# Patient Record
Sex: MALE | Race: WHITE | NOT HISPANIC OR LATINO | URBAN - METROPOLITAN AREA
[De-identification: names, ages, dates, MRNs, and addresses within clinical notes are randomized per-mention and may not be internally consistent; named-entity substitution may affect disease eponyms.]

---

## 2020-10-23 ENCOUNTER — OFFICE VISIT (OUTPATIENT)
Dept: URBAN - METROPOLITAN AREA CLINIC 78 | Facility: CLINIC | Age: 65
End: 2020-10-23
Payer: COMMERCIAL

## 2020-10-23 DIAGNOSIS — Z12.11 COLON CANCER SCREENING: ICD-10-CM

## 2020-10-23 PROCEDURE — 99203 OFFICE O/P NEW LOW 30 MIN: CPT | Performed by: INTERNAL MEDICINE

## 2020-10-23 RX ORDER — SODIUM, POTASSIUM,MAG SULFATES 17.5-3.13G
177 ML SOLUTION, RECONSTITUTED, ORAL ORAL
Qty: 1 PACKAGE | Refills: 0 | OUTPATIENT
Start: 2020-10-23 | End: 2020-10-24

## 2020-10-23 NOTE — HPI-TODAY'S VISIT:
The patient presents for a colon cancer screening.   Patient has had a colonoscopy.  The colonoscopy revealed polyps / Diverticulosis / Internal hemorrhoids.  There is no family history of colon polyps or cancer.   Patient denies change in bowel habits, appetite and weight.  Patient denies bleeding per rectum.

## 2020-12-08 ENCOUNTER — OFFICE VISIT (OUTPATIENT)
Dept: URBAN - METROPOLITAN AREA SURGERY CENTER 15 | Facility: SURGERY CENTER | Age: 65
End: 2020-12-08
Payer: COMMERCIAL

## 2020-12-08 DIAGNOSIS — Z86.010 H/O ADENOMATOUS POLYP OF COLON: ICD-10-CM

## 2020-12-08 PROCEDURE — G8907 PT DOC NO EVENTS ON DISCHARG: HCPCS | Performed by: INTERNAL MEDICINE

## 2020-12-08 PROCEDURE — G9936 PMH PLYP/NEO CO/RECT/JUN/ANS: HCPCS | Performed by: INTERNAL MEDICINE

## 2020-12-08 PROCEDURE — 45378 DIAGNOSTIC COLONOSCOPY: CPT | Performed by: INTERNAL MEDICINE

## 2023-04-24 ENCOUNTER — OFFICE VISIT (OUTPATIENT)
Dept: URBAN - METROPOLITAN AREA CLINIC 78 | Facility: CLINIC | Age: 68
End: 2023-04-24
Payer: COMMERCIAL

## 2023-04-24 ENCOUNTER — DASHBOARD ENCOUNTERS (OUTPATIENT)
Age: 68
End: 2023-04-24

## 2023-04-24 VITALS
TEMPERATURE: 98.1 F | HEART RATE: 64 BPM | SYSTOLIC BLOOD PRESSURE: 143 MMHG | BODY MASS INDEX: 30.41 KG/M2 | HEIGHT: 71 IN | RESPIRATION RATE: 16 BRPM | DIASTOLIC BLOOD PRESSURE: 83 MMHG | WEIGHT: 217.2 LBS

## 2023-04-24 DIAGNOSIS — K52.9 CHRONIC DIARRHEA: ICD-10-CM

## 2023-04-24 PROCEDURE — 99204 OFFICE O/P NEW MOD 45 MIN: CPT | Performed by: INTERNAL MEDICINE

## 2023-04-24 RX ORDER — CHOLESTYRAMINE POWDER FOR SUSPENSION 4 G/8.78G
1 SCOOP POWDER, FOR SUSPENSION ORAL ONCE A DAY
Qty: 30 | Refills: 0 | OUTPATIENT

## 2023-04-24 RX ORDER — TADALAFIL 10 MG/1
1 TABLET AS NEEDED TABLET, FILM COATED ORAL ONCE A DAY
Status: ACTIVE | COMMUNITY

## 2023-04-24 NOTE — HPI-TODAY'S VISIT:
Patient was referred by Erick Lara  A copy of this document will be sent to the physician.   Patient is a 67-year-old male seen with regards to change in bowel consistency since July 2022.  His baseline bowel movements are normal once a day.  He now reports 4-5 BMs mostly soft to loose consistency daily PCP gave him antibiotics initially and also ruled out infectious cause by stool studies Denies blood in stool, denies international travel, denies sick contacts. History taking Aleve once a week He does gas cramps relieved by having a bowel movement  Denies  constipation or nocturnal symptoms    Denies Family history of celiac disease, IBD

## 2023-04-28 LAB — PANCREATIC ELASTASE-1: 378

## 2023-05-25 ENCOUNTER — TELEPHONE ENCOUNTER (OUTPATIENT)
Dept: URBAN - METROPOLITAN AREA CLINIC 78 | Facility: CLINIC | Age: 68
End: 2023-05-25

## 2023-05-25 RX ORDER — SODIUM PICOSULFATE, MAGNESIUM OXIDE, AND ANHYDROUS CITRIC ACID 12; 3.5; 1 G/175ML; G/175ML; MG/175ML
175 ML THE FIRST DOSE THE EVENING BEFORE AND SECOND DOSE THE MORNING OF COLONOSCOPY LIQUID ORAL ONCE A DAY
Qty: 350 | OUTPATIENT
Start: 2023-05-26 | End: 2023-05-28

## 2023-06-01 ENCOUNTER — TELEPHONE ENCOUNTER (OUTPATIENT)
Dept: URBAN - METROPOLITAN AREA CLINIC 78 | Facility: CLINIC | Age: 68
End: 2023-06-01

## 2023-06-01 RX ORDER — CHOLESTYRAMINE POWDER FOR SUSPENSION 4 G/8.78G
1 SCOOP POWDER, FOR SUSPENSION ORAL ONCE A DAY
Qty: 30 | Refills: 3

## 2023-06-20 ENCOUNTER — TELEPHONE ENCOUNTER (OUTPATIENT)
Dept: URBAN - METROPOLITAN AREA CLINIC 113 | Facility: CLINIC | Age: 68
End: 2023-06-20

## 2023-06-20 RX ORDER — SODIUM PICOSULFATE, MAGNESIUM OXIDE, AND ANHYDROUS CITRIC ACID 12; 3.5; 1 G/175ML; G/175ML; MG/175ML
175 ML THE FIRST DOSE THE EVENING BEFORE AND SECOND DOSE THE MORNING OF COLONOSCOPY LIQUID ORAL ONCE A DAY
Qty: 350
Start: 2023-05-26 | End: 2023-06-22

## 2023-06-22 ENCOUNTER — CLAIMS CREATED FROM THE CLAIM WINDOW (OUTPATIENT)
Dept: URBAN - METROPOLITAN AREA SURGERY CENTER 15 | Facility: SURGERY CENTER | Age: 68
End: 2023-06-22
Payer: COMMERCIAL

## 2023-06-22 ENCOUNTER — OFFICE VISIT (OUTPATIENT)
Dept: URBAN - METROPOLITAN AREA SURGERY CENTER 15 | Facility: SURGERY CENTER | Age: 68
End: 2023-06-22

## 2023-06-22 ENCOUNTER — CLAIMS CREATED FROM THE CLAIM WINDOW (OUTPATIENT)
Dept: URBAN - METROPOLITAN AREA CLINIC 4 | Facility: CLINIC | Age: 68
End: 2023-06-22
Payer: COMMERCIAL

## 2023-06-22 DIAGNOSIS — K63.5 BENIGN COLON POLYP: ICD-10-CM

## 2023-06-22 DIAGNOSIS — K63.89 OTHER SPECIFIED DISEASES OF INTESTINE: ICD-10-CM

## 2023-06-22 DIAGNOSIS — R19.7 CHRONIC DIARRHEA: ICD-10-CM

## 2023-06-22 PROCEDURE — 88342 IMHCHEM/IMCYTCHM 1ST ANTB: CPT | Performed by: PATHOLOGY

## 2023-06-22 PROCEDURE — 45380 COLONOSCOPY AND BIOPSY: CPT | Performed by: INTERNAL MEDICINE

## 2023-06-22 PROCEDURE — G8907 PT DOC NO EVENTS ON DISCHARG: HCPCS | Performed by: INTERNAL MEDICINE

## 2023-06-22 PROCEDURE — 88313 SPECIAL STAINS GROUP 2: CPT | Performed by: PATHOLOGY

## 2023-06-22 PROCEDURE — 88305 TISSUE EXAM BY PATHOLOGIST: CPT | Performed by: PATHOLOGY
